# Patient Record
Sex: MALE | Race: WHITE | Employment: OTHER | URBAN - METROPOLITAN AREA
[De-identification: names, ages, dates, MRNs, and addresses within clinical notes are randomized per-mention and may not be internally consistent; named-entity substitution may affect disease eponyms.]

---

## 2017-01-18 PROCEDURE — 82607 VITAMIN B-12: CPT | Performed by: NURSE PRACTITIONER

## 2017-01-18 PROCEDURE — 80184 ASSAY OF PHENOBARBITAL: CPT | Performed by: NURSE PRACTITIONER

## 2017-01-18 PROCEDURE — 80175 DRUG SCREEN QUAN LAMOTRIGINE: CPT | Performed by: NURSE PRACTITIONER

## 2017-01-18 PROCEDURE — 80171 DRUG SCREEN QUANT GABAPENTIN: CPT | Performed by: NURSE PRACTITIONER

## 2017-01-30 PROCEDURE — 36415 COLL VENOUS BLD VENIPUNCTURE: CPT | Performed by: NURSE PRACTITIONER

## 2017-01-30 PROCEDURE — 80184 ASSAY OF PHENOBARBITAL: CPT | Performed by: NURSE PRACTITIONER

## 2017-04-28 PROBLEM — G40.909 SEIZURE DISORDER (HCC): Status: ACTIVE | Noted: 2017-04-28

## 2017-05-15 ENCOUNTER — NURSE ONLY (OUTPATIENT)
Dept: ELECTROPHYSIOLOGY | Facility: HOSPITAL | Age: 47
End: 2017-05-15
Attending: NURSE PRACTITIONER
Payer: MEDICARE

## 2017-05-15 DIAGNOSIS — R56.9 SEIZURE (HCC): ICD-10-CM

## 2017-05-15 DIAGNOSIS — Q28.2 CEREBRAL AVM: ICD-10-CM

## 2017-05-15 PROCEDURE — 95813 EEG EXTND MNTR 61-119 MIN: CPT

## 2017-05-16 NOTE — PROCEDURES
[facility], [address] [place] [address]      PATIENT'S NAME: Brittney Farah   ATTENDING PHYSICIAN: Pauline Nam M.D.    PATIENT ACCOUNT #: [de-identified] [place] [facility] [place]   MEDICAL RECORD #: [id number] DATE OF BIRTH seizure activity on this exam.  There was, again, consistent left hemispheric slowing throughout the entire exam.  The patient did not have any of his typical clinical episodes suspicious for seizure, i.e. no tremors were noted.   The findings noted on this

## 2017-05-16 NOTE — PROGRESS NOTES
Quick Note:    Has sharp waves on EEG, propensity toward seizures  If clinical seizures are still happening, med adjustments may be made appropriately  ______

## 2017-06-12 PROCEDURE — 36415 COLL VENOUS BLD VENIPUNCTURE: CPT | Performed by: NURSE PRACTITIONER

## 2017-06-12 PROCEDURE — 80184 ASSAY OF PHENOBARBITAL: CPT | Performed by: NURSE PRACTITIONER

## 2017-06-30 PROCEDURE — 80184 ASSAY OF PHENOBARBITAL: CPT | Performed by: NURSE PRACTITIONER

## 2017-06-30 PROCEDURE — 82140 ASSAY OF AMMONIA: CPT | Performed by: NURSE PRACTITIONER

## 2017-06-30 PROCEDURE — 80171 DRUG SCREEN QUANT GABAPENTIN: CPT | Performed by: NURSE PRACTITIONER

## 2017-07-14 ENCOUNTER — HOSPITAL ENCOUNTER (OUTPATIENT)
Age: 47
Discharge: HOME | End: 2017-07-14
Attending: FAMILY MEDICINE
Payer: MEDICARE

## 2017-07-14 VITALS
SYSTOLIC BLOOD PRESSURE: 125 MMHG | OXYGEN SATURATION: 97 % | HEART RATE: 63 BPM | RESPIRATION RATE: 18 BRPM | DIASTOLIC BLOOD PRESSURE: 92 MMHG

## 2017-07-14 DIAGNOSIS — M54.50 ACUTE MIDLINE LOW BACK PAIN WITHOUT SCIATICA: ICD-10-CM

## 2017-07-14 DIAGNOSIS — B37.2 YEAST DERMATITIS: Primary | ICD-10-CM

## 2017-07-14 PROCEDURE — 99204 OFFICE O/P NEW MOD 45 MIN: CPT

## 2017-07-14 PROCEDURE — 99203 OFFICE O/P NEW LOW 30 MIN: CPT

## 2017-07-14 RX ORDER — NYSTATIN 500000 [USP'U]/1
1 TABLET, COATED ORAL EVERY 8 HOURS SCHEDULED
Qty: 21 TABLET | Refills: 0 | Status: SHIPPED | OUTPATIENT
Start: 2017-07-14 | End: 2018-03-07 | Stop reason: ALTCHOICE

## 2017-07-14 NOTE — ED PROVIDER NOTES
Patient Seen in: [facility]    History   Patient presents with:  Back Pain    Stated Complaint: pain low back x 1 day    HPI    This 44-year-old male presents to the office with complaint of lower back pain which started la 2 (two) times daily. gabapentin 300 MG Oral Cap,  Take 3 capsules (900 mg total) by mouth 3 (three) times daily.    PHENobarbital 32.4 MG Oral Tab,  2 in the am and 3 in the pm   mupirocin 2 % External Ointment,  Apply 1 Application topically 2 (two) time ED Triage Vitals [07/14/17 1621]  BP: 125/92  Pulse: 63  Resp: 18  Temp: n/a  Temp src: Temporal  SpO2: 97 %  O2 Device: None (Room air)    Current:/92   Pulse 63   Resp 18   SpO2 97%         Physical Exam    General: WH/WN/WD, in NAD, sitting in h nystatin tablets 1 tablet every 8 hours for the next week until the redness and pain to your lower back resolves. Continue to apply the nystatin cream topically. You may take Tylenol as needed for discomfort.   Follow-up with your primary doctor in 3 days

## 2017-07-21 PROCEDURE — 83655 ASSAY OF LEAD: CPT | Performed by: FAMILY MEDICINE

## 2017-07-21 PROCEDURE — 82175 ASSAY OF ARSENIC: CPT | Performed by: FAMILY MEDICINE

## 2018-03-06 PROCEDURE — 80175 DRUG SCREEN QUAN LAMOTRIGINE: CPT | Performed by: NURSE PRACTITIONER

## 2018-03-06 PROCEDURE — 82607 VITAMIN B-12: CPT | Performed by: NURSE PRACTITIONER

## 2018-03-06 PROCEDURE — 80171 DRUG SCREEN QUANT GABAPENTIN: CPT | Performed by: NURSE PRACTITIONER

## 2018-03-06 PROCEDURE — 80184 ASSAY OF PHENOBARBITAL: CPT | Performed by: NURSE PRACTITIONER

## 2018-03-08 PROCEDURE — 36415 COLL VENOUS BLD VENIPUNCTURE: CPT | Performed by: FAMILY MEDICINE

## 2018-03-08 PROCEDURE — 82175 ASSAY OF ARSENIC: CPT | Performed by: FAMILY MEDICINE

## 2018-03-08 PROCEDURE — 83655 ASSAY OF LEAD: CPT | Performed by: FAMILY MEDICINE

## 2018-03-09 NOTE — ED NOTES
Pt called ED sounded sleepy and lethargic pt called ER concerned that he feels someone is poisoning him and what tests would we do to see what poison he may have ingested. Pt then states he feels he is fine and hung up.  [facility] [facility] police notified by Yrn Guardado

## 2018-09-13 PROBLEM — F41.9 ANXIETY: Status: ACTIVE | Noted: 2018-09-13

## 2019-02-25 PROBLEM — R25.2 SPASTICITY: Status: ACTIVE | Noted: 2019-02-25

## 2019-03-05 PROCEDURE — 80175 DRUG SCREEN QUAN LAMOTRIGINE: CPT | Performed by: NURSE PRACTITIONER

## 2019-03-05 PROCEDURE — 80171 DRUG SCREEN QUANT GABAPENTIN: CPT | Performed by: NURSE PRACTITIONER

## 2019-07-02 PROCEDURE — 87070 CULTURE OTHR SPECIMN AEROBIC: CPT | Performed by: FAMILY MEDICINE

## 2019-07-02 PROCEDURE — 87186 SC STD MICRODIL/AGAR DIL: CPT | Performed by: FAMILY MEDICINE

## 2019-07-02 PROCEDURE — 87205 SMEAR GRAM STAIN: CPT | Performed by: FAMILY MEDICINE

## 2019-07-02 PROCEDURE — 87077 CULTURE AEROBIC IDENTIFY: CPT | Performed by: FAMILY MEDICINE

## 2019-07-30 PROBLEM — R09.02 HYPOXEMIA: Status: ACTIVE | Noted: 2017-01-10

## 2019-07-30 PROBLEM — G47.31 CENTRAL SLEEP APNEA: Status: ACTIVE | Noted: 2017-01-10

## 2019-09-22 ENCOUNTER — HOSPITAL ENCOUNTER (EMERGENCY)
Facility: HOSPITAL | Age: 49
Discharge: HOME | End: 2019-09-22
Attending: EMERGENCY MEDICINE
Payer: MEDICARE

## 2019-09-22 ENCOUNTER — APPOINTMENT (OUTPATIENT)
Dept: CT IMAGING | Facility: HOSPITAL | Age: 49
End: 2019-09-22
Attending: EMERGENCY MEDICINE
Payer: MEDICARE

## 2019-09-22 VITALS
OXYGEN SATURATION: 98 % | HEIGHT: 75 IN | RESPIRATION RATE: 15 BRPM | DIASTOLIC BLOOD PRESSURE: 109 MMHG | WEIGHT: 190 LBS | SYSTOLIC BLOOD PRESSURE: 143 MMHG | TEMPERATURE: 98 F | HEART RATE: 84 BPM | BODY MASS INDEX: 23.62 KG/M2

## 2019-09-22 DIAGNOSIS — S09.90XA INJURY OF HEAD, INITIAL ENCOUNTER: Primary | ICD-10-CM

## 2019-09-22 DIAGNOSIS — S01.81XA FACIAL LACERATION, INITIAL ENCOUNTER: ICD-10-CM

## 2019-09-22 DIAGNOSIS — S02.19XA CLOSED FRACTURE OF FRONTAL SINUS, INITIAL ENCOUNTER (HCC): ICD-10-CM
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
## 2019-09-22 PROCEDURE — 99284 EMERGENCY DEPT VISIT MOD MDM: CPT

## 2019-09-22 PROCEDURE — 12015 RPR F/E/E/N/L/M 7.6-12.5 CM: CPT

## 2019-09-22 PROCEDURE — 72125 CT NECK SPINE W/O DYE: CPT | Performed by: EMERGENCY MEDICINE

## 2019-09-22 PROCEDURE — 96374 THER/PROPH/DIAG INJ IV PUSH: CPT

## 2019-09-22 PROCEDURE — 70450 CT HEAD/BRAIN W/O DYE: CPT | Performed by: EMERGENCY MEDICINE

## 2019-09-22 RX ORDER — BUPIVACAINE HYDROCHLORIDE 2.5 MG/ML
INJECTION, SOLUTION EPIDURAL; INFILTRATION; INTRACAUDAL
Status: COMPLETED
Start: 2019-09-22 | End: 2019-09-22

## 2019-09-22 RX ORDER — MORPHINE SULFATE 4 MG/ML
4 INJECTION, SOLUTION INTRAMUSCULAR; INTRAVENOUS ONCE
Status: COMPLETED | OUTPATIENT
Start: 2019-09-22 | End: 2019-09-22

## 2019-09-22 RX ORDER — HYDROCODONE BITARTRATE AND ACETAMINOPHEN 5; 325 MG/1; MG/1
2 TABLET ORAL ONCE
Status: DISCONTINUED | OUTPATIENT
Start: 2019-09-22 | End: 2019-09-22

## 2019-09-22 RX ORDER — LEVOFLOXACIN 500 MG/1
500 TABLET, FILM COATED ORAL DAILY
Qty: 7 TABLET | Refills: 0 | Status: SHIPPED | OUTPATIENT
Start: 2019-09-22 | End: 2019-09-29

## 2019-09-22 RX ORDER — ACETAMINOPHEN 500 MG
1000 TABLET ORAL ONCE
Status: COMPLETED | OUTPATIENT
Start: 2019-09-22 | End: 2019-09-22

## 2019-09-22 RX ORDER — BUPIVACAINE HYDROCHLORIDE 2.5 MG/ML
5 INJECTION, SOLUTION EPIDURAL; INFILTRATION; INTRACAUDAL ONCE
Status: COMPLETED | OUTPATIENT
Start: 2019-09-22 | End: 2019-09-22

## 2019-09-22 NOTE — ED INITIAL ASSESSMENT (HPI)
Patient arrives via medics with laceration to forehead. Patient is paraplegic, was working out at home and believes he lost balance, fell forward to the ground and hit head on weight and sustained laceration to forehead.   Patient head is wrapped in gauze

## 2019-09-23 NOTE — ED PROVIDER NOTES
Patient Seen in: [facility] Emergency Department      History   Patient presents with:  Trauma (cardiovascular, musculoskeletal)    Stated Complaint:     HPI    Patient is a 57-year-old male presents to ED for evaluation after a fall out of his whee None (Room air)       Current:BP (!) 147/103   Pulse 82   Temp 98 °F (36.7 °C) (Axillary)   Resp 15   Ht 190.5 cm (6' 3\")   Wt 86.2 kg   SpO2 100%   BMI 23.75 kg/m²         Physical Exam    Constitutional: Patient is a gentleman who is in no acute distres the posterior left lateral ventricle, left occipital horn, and left temporal horn noted. This is similar since the remote head CT since 12/7/2007.   There is a small amount of encephalomalacia is seen within the parasagittal left parietal lobe, unchanged s narrowing is noted. There is scattered facet and uncinate hypertrophy. Please refer to the concurrent head CT for further characterization of the intracranial findings. There is nonspecific nodularity to the left occipital scalp.   Clinical correlation w verbal and written discharge and follow-up instructions were provided to help prevent relapse or worsening.   Patient was instructed to follow-up with her primary care provider for further evaluation and treatment, but to return immediately to the ER for wo

## 2020-01-10 PROBLEM — I69.351 HEMIPLEGIA AND HEMIPARESIS FOLLOWING CEREBRAL INFARCTION AFFECTING RIGHT DOMINANT SIDE (HCC): Status: ACTIVE | Noted: 2020-01-10

## 2020-05-19 ENCOUNTER — HOSPITAL ENCOUNTER (OUTPATIENT)
Dept: GENERAL RADIOLOGY | Facility: HOSPITAL | Age: 50
Discharge: HOME | End: 2020-05-19
Attending: FAMILY MEDICINE
Payer: MEDICARE

## 2020-05-19 DIAGNOSIS — T17.998A ASPIRATION OF LIQUID, INITIAL ENCOUNTER: ICD-10-CM

## 2020-05-19 DIAGNOSIS — R05.9 COUGH: ICD-10-CM

## 2020-05-19 PROCEDURE — 92611 MOTION FLUOROSCOPY/SWALLOW: CPT

## 2020-05-19 PROCEDURE — 74230 X-RAY XM SWLNG FUNCJ C+: CPT | Performed by: FAMILY MEDICINE

## 2020-05-19 NOTE — PROGRESS NOTES
ADULT VIDEOFLUOROSCOPIC SWALLOWING STUDY    Admission Date: 5/19/2020  Evaluation Date: 05/19/20  Radiologist: Dr. Lory Birmingham   Diet Recommendations - Solids: Regular  Diet Recommendations - Liquid: Thin(no straws)    Further Follow-up: prior to any po presentation. He reports he drinks from cups and straws but that straws are easier for him. Family/Patient Goals:   To find reason for ongoing cough     ASSESSMENT   DYSPHAGIA ASSESSMENT  Test completed in conjunction with Radiologist. Yes  Penetration Aspiration Scale Score: Score 8: Material enters the airway, passes below the vocal folds, and no effort is made to eject(with a delayed, ineffective cough)       Overall Impression: Patient presents with mild oral and moderate pharyngeal

## 2020-06-05 ENCOUNTER — OFFICE VISIT (OUTPATIENT)
Dept: SPEECH THERAPY | Facility: HOSPITAL | Age: 50
End: 2020-06-05
Attending: FAMILY MEDICINE
Payer: MEDICARE

## 2020-06-05 PROCEDURE — 92610 EVALUATE SWALLOWING FUNCTION: CPT

## 2020-06-05 NOTE — PROGRESS NOTES
ADULT SWALLOWING EVALUATION:   Referring Physician: Dr. Tom Carranza  Diagnosis: Cough (R05); Hemiplegia and hemiparesis following cerebral infarction affecting right dominant side (HCC) (I69.351); Aspiration of liquid, initial encounter (E85.172G);  Seizure disor 6  FINASTERIDE 1 MG Oral Tab, TAKE ONE TABLET BY MOUTH DAILY. , Disp: 30 tablet, Rfl: 6  Ciclopirox 1 % External Shampoo, Use QOD to scalp, Disp: 120 mL, Rfl: 6  FLUOCINOLONE ACETONIDE 0.01 % External Solution, APPLY TO ITCHY SPOTS ONCE DAILY, Disp: 60 mL, Oral Tab, Take by mouth daily. , Disp: , Rfl:   Vitamin B-12 1000 MCG Oral Tab, Take 3,000 mcg by mouth daily. , Disp: , Rfl:   folic acid 1 MG Oral Tab, Take 1 mg by mouth daily. , Disp: , Rfl:   Biotin (BIOTIN 5000) 5 MG Oral Cap, Take by mouth daily. , Disp liquids (NTL),  puree (applesauce), and hard solids (estella cracker). Oral phase was deemed impaired characterized by mild-moderate residue for soft and hard solids upon visual check of oral cavity; VFSS findings also showed deficits for bolus formation/pr particularly water. (Please note: Silent aspiration cannot be evaluated clinically.  Videofluoroscopic Swallow Study is required to rule-out silent aspiration.)    Compensatory Swallow Strategies Utilized: Upright 90 degrees, slow rate, small bites, double Potential: good for stated goals given regular attendance and compliance with HEP.       Patient/Family/Caregiver was advised of these findings, precautions, and treatment options and has agreed to actively participate in planning and for this course of car

## 2020-06-19 ENCOUNTER — OFFICE VISIT (OUTPATIENT)
Dept: SPEECH THERAPY | Facility: HOSPITAL | Age: 50
End: 2020-06-19
Attending: FAMILY MEDICINE
Payer: MEDICARE

## 2020-06-19 PROCEDURE — 92526 ORAL FUNCTION THERAPY: CPT

## 2020-06-19 NOTE — PROGRESS NOTES
Treatment #2/10 (Medicare 2/10; POC thru 9/3/20) Treatment Time: 60 minutes  Precautions: aspiration     Charges: 1 billed ([id number])  Pain: 0/10      Diagnosis: Cough (R05);  Hemiplegia and hemiparesis following cerebral infarction affecting right dominant si strengthening exercises (eg: Effortful swallow, mendelhsohn) x10 given min verbal/visual cues to improve strength/function of swallow mechanism (3 months). Discontinue due to lack of appropriateness.     Assessment: Patient presents with oropharyngeal dysp

## 2020-06-23 ENCOUNTER — OFFICE VISIT (OUTPATIENT)
Dept: SPEECH THERAPY | Facility: HOSPITAL | Age: 50
End: 2020-06-23
Attending: FAMILY MEDICINE
Payer: MEDICARE

## 2020-06-23 PROCEDURE — 92526 ORAL FUNCTION THERAPY: CPT

## 2020-06-23 NOTE — PROGRESS NOTES
Treatment #3/10 (Medicare 3/10; POC thru 9/3/20) Treatment Time: 60 minutes  Precautions: aspiration     Charges: 1 billed ([id number])  Pain: 0/10      Diagnosis: Cough (R05);  Hemiplegia and hemiparesis following cerebral infarction affecting right dominant si of water during this session. Assessment: Patient presents with oropharyngeal dysphagia characterized by impaired bolus formation/propulsion and s/s of aspiration (ie: consistent cough and wet voice).   VFSS findings from 5/19/20 showed: Patient presents

## 2020-06-30 ENCOUNTER — OFFICE VISIT (OUTPATIENT)
Dept: SPEECH THERAPY | Facility: HOSPITAL | Age: 50
End: 2020-06-30
Attending: FAMILY MEDICINE
Payer: MEDICARE

## 2020-06-30 PROCEDURE — 92526 ORAL FUNCTION THERAPY: CPT

## 2020-06-30 NOTE — PROGRESS NOTES
Treatment #4/10 (Medicare 4/10; POC thru 9/3/20) Treatment Time: 60 minutes  Precautions: aspiration     Charges: 1 billed ([id number])  Pain: 0/10      Diagnosis: Cough (R05);  Hemiplegia and hemiparesis following cerebral infarction affecting right dominant si formation/propulsion and s/s of aspiration (ie: consistent cough and wet voice).   VFSS findings from 5/19/20 showed: Patient presents with mild oral and moderate pharyngeal dysphagia characterized by reduced bolus formation and control with all po though w

## 2020-07-06 ENCOUNTER — TELEPHONE (OUTPATIENT)
Dept: SURGERY | Facility: CLINIC | Age: 50
End: 2020-07-06

## 2020-07-06 NOTE — TELEPHONE ENCOUNTER
Message below noted. Ok to schedule surgical discussion appointment with . Message forwarded to the  to schedule above appointment.

## 2020-07-06 NOTE — TELEPHONE ENCOUNTER
Pt's spouse calling to see if baclofen pump can be replaced (by Oct of 2020), last replacement in 2014 w/Dr. Jaja Stokes; Dr. Jaja Stokes is no longer @ [facility], pt has not tried to locate Dr. Jaja Stokes but was given Dr. Romie Oates by PCP/insurace; spouse awaiting c

## 2020-07-07 ENCOUNTER — OFFICE VISIT (OUTPATIENT)
Dept: SPEECH THERAPY | Facility: HOSPITAL | Age: 50
End: 2020-07-07
Attending: FAMILY MEDICINE
Payer: MEDICARE

## 2020-07-07 PROCEDURE — 92526 ORAL FUNCTION THERAPY: CPT

## 2020-07-07 NOTE — PROGRESS NOTES
Treatment #6/10 (Medicare 4/10; POC thru 9/3/20) Treatment Time: 60 minutes  Precautions: aspiration     Charges: 1 billed ([id number])  Pain: 0/10      Diagnosis: Cough (R05);  Hemiplegia and hemiparesis following cerebral infarction affecting right dominant si larger volume boluses (e.g. drinking via straw). There was uncontrolled spillage of the liquids to the valleculae and pyriform sinuses.   There was laryngeal penetration and silent tracheal aspiration with thin liquids by straw with delayed, ineffective co

## 2020-07-15 ENCOUNTER — TELEPHONE (OUTPATIENT)
Dept: SPEECH THERAPY | Facility: HOSPITAL | Age: 50
End: 2020-07-15

## 2020-07-15 ENCOUNTER — APPOINTMENT (OUTPATIENT)
Dept: SPEECH THERAPY | Facility: HOSPITAL | Age: 50
End: 2020-07-15
Attending: FAMILY MEDICINE
Payer: MEDICARE

## 2020-07-22 ENCOUNTER — OFFICE VISIT (OUTPATIENT)
Dept: SPEECH THERAPY | Facility: HOSPITAL | Age: 50
End: 2020-07-22
Attending: FAMILY MEDICINE
Payer: MEDICARE

## 2020-07-22 PROCEDURE — 92526 ORAL FUNCTION THERAPY: CPT

## 2020-07-22 NOTE — PROGRESS NOTES
Treatment #7/10 (Medicare 7/10; POC thru 9/3/20) Treatment Time: 60 minutes  Precautions: aspiration     Charges: 1 billed ([id number])  Pain: 0/10      Diagnosis: Cough (R05);  Hemiplegia and hemiparesis following cerebral infarction affecting right dominant si or tracheal aspiration with thin by cup or solids. SLP Diagnosis: Oropharyngeal Dysphagia (R13.12)    Plan: Continue swallow therapy targeting use of aspiration precautions and compensatory strategies.   HEP assigned to complete good oral hygiene and follo

## 2020-07-29 ENCOUNTER — OFFICE VISIT (OUTPATIENT)
Dept: SPEECH THERAPY | Facility: HOSPITAL | Age: 50
End: 2020-07-29
Attending: FAMILY MEDICINE
Payer: MEDICARE

## 2020-07-29 PROCEDURE — 92526 ORAL FUNCTION THERAPY: CPT

## 2020-07-29 NOTE — PROGRESS NOTES
Treatment #8/10 (Medicare 7/10; POC thru 9/3/20) Treatment Time: 60 minutes  Precautions: aspiration     Charges: 1 billed ([id number])  Pain: 0/10      Diagnosis: Cough (R05);  Hemiplegia and hemiparesis following cerebral infarction affecting right dominant si aspiration with thin by cup or solids. SLP Diagnosis: Oropharyngeal Dysphagia (R13.12)    Plan: Continue swallow therapy targeting use of aspiration precautions and compensatory strategies.   HEP assigned to complete good oral hygiene and follow with 80 sw

## 2020-08-05 ENCOUNTER — OFFICE VISIT (OUTPATIENT)
Dept: SPEECH THERAPY | Facility: HOSPITAL | Age: 50
End: 2020-08-05
Attending: FAMILY MEDICINE
Payer: MEDICARE

## 2020-08-05 PROCEDURE — 92526 ORAL FUNCTION THERAPY: CPT

## 2020-08-05 NOTE — PROGRESS NOTES
Treatment #9/10 (Medicare 7/10; POC thru 9/3/20) Treatment Time: 60 minutes  Precautions: aspiration     Charges: 1 billed ([id number])  Pain: 0/10      Diagnosis: Cough (R05);  Hemiplegia and hemiparesis following cerebral infarction affecting right dominant si laryngeal penetration or tracheal aspiration with thin by cup or solids. SLP Diagnosis: Oropharyngeal Dysphagia (R13.12)    Plan: Discussed plateau in progress. Patient in agreement.   Continue swallow therapy for one more session with discharge to follow

## 2020-08-12 ENCOUNTER — OFFICE VISIT (OUTPATIENT)
Dept: SPEECH THERAPY | Facility: HOSPITAL | Age: 50
End: 2020-08-12
Attending: FAMILY MEDICINE
Payer: MEDICARE

## 2020-08-12 PROCEDURE — 92526 ORAL FUNCTION THERAPY: CPT

## 2020-08-12 NOTE — PROGRESS NOTES
Treatment #10/10 (Medicare 10/10; POC thru 9/3/20) Treatment Time: 60 minutes  Precautions: aspiration     Charges: 1 billed ([id number])  Pain: 0/10      Diagnosis: Cough (R05);  Hemiplegia and hemiparesis following cerebral infarction affecting right dominant aspiration precautions and posture during PO intake and that this has facilitated improvement.      STG 2: Patient will use aspiration precautions and/or compensatory swallowing strategies with clinician/caregiver during small snack/meal with 100% accuracy

## 2020-08-27 ENCOUNTER — TELEPHONE (OUTPATIENT)
Dept: SURGERY | Facility: CLINIC | Age: 50
End: 2020-08-27

## 2020-08-27 ENCOUNTER — OFFICE VISIT (OUTPATIENT)
Dept: SURGERY | Facility: CLINIC | Age: 50
End: 2020-08-27
Payer: MEDICARE

## 2020-08-27 VITALS
BODY MASS INDEX: 25 KG/M2 | DIASTOLIC BLOOD PRESSURE: 70 MMHG | WEIGHT: 196 LBS | SYSTOLIC BLOOD PRESSURE: 118 MMHG | HEART RATE: 74 BPM

## 2020-08-27 DIAGNOSIS — Z97.8 PRESENCE OF INTRATHECAL PUMP: Primary | ICD-10-CM

## 2020-08-27 PROCEDURE — 3074F SYST BP LT 130 MM HG: CPT | Performed by: NEUROLOGICAL SURGERY

## 2020-08-27 PROCEDURE — 3078F DIAST BP <80 MM HG: CPT | Performed by: NEUROLOGICAL SURGERY

## 2020-08-27 PROCEDURE — 99203 OFFICE O/P NEW LOW 30 MIN: CPT | Performed by: NEUROLOGICAL SURGERY

## 2020-08-27 NOTE — H&P
Neurosurgery Clinic Visit  2020    Lety Bhakta PCP:  Justin Barnhart MD    3/23/1970 MRN [id number]       CHIEF COMPLAINT:  Patient presents with:  New Patient      HISTORY OF PRESENT ILLNESS:  Lety Bhakta is a(n) 52year old male who was Spacer/Aero-Holding Chambers (AEROCHAMBER W/FLOWSIGNAL) Does not apply Misc Take as directed 1 each 0   • Cholecalciferol (VITAMIN D) 1000 units Oral Tab Take 1 tablet by mouth daily.      • Calcium Carb-Ergocalciferol 500-200 MG-UNIT Oral Tab Take 1 tablet SURGICAL HISTORY  2014    baclofen pump replacement   (sani)     No family history on file. Willie Reina  reports that he has never smoked. He has never used smokeless tobacco. He reports that he does not drink alcohol or use drugs.     PHYSICAL EXAMINATION:  V bleeding, infection, neurological injury, and failure to resolve pain or symptoms. The patient wishes to pursue surgical intervention. Will call to schedule with his wife. Dr. Anyi Garcia evaluated the patient and is in agreement with the plan.       Total

## 2020-08-27 NOTE — H&P (VIEW-ONLY)
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      • Calcium Carb-Ergocalciferol 500-200 MG-UNIT Oral Tab Take 1 tablet SURGICAL HISTORY  2014    baclofen pump replacement   (sani)     No family history on file. Gordo Naranjo  reports that he has never smoked. He has never used smokeless tobacco. He reports that he does not drink alcohol or use drugs.     PHYSICAL EXAMINATION:  V bleeding, infection, neurological injury, and failure to resolve pain or symptoms. The patient wishes to pursue surgical intervention. Will call to schedule with his wife. Dr. Milton Day evaluated the patient and is in agreement with the plan.       Total

## 2020-08-27 NOTE — PROGRESS NOTES
New Pt referred by Pt's PCP for baclofen pump replacement.       pump was placed in 2014 by Dr Katia Mcgee

## 2020-08-27 NOTE — PATIENT INSTRUCTIONS
Refill policies:    • Allow 2-3 business days for refills; controlled substances may take longer.   • Contact your pharmacy at least 5 days prior to running out of medication and have them send an electronic request or submit request through the “request re Depending on your insurance carrier, approval may take 3-10 days. It is highly recommended patients contact their insurance carrier directly to determine coverage.   If test is done without insurance authorization, patient may be responsible for the entire pre-admissions department at [phone] to speak with a nurse. ·   · You will need to have some blood work done prior to surgery.   ·   · Nothing to eat or drink 11:00pm the night before your surgery unless otherwise instructed   ·   · Do drink 12 ounces [phone]. -The hard copy of script will need to be mailed to the compounding pharmacy to address:    The    [facility] [facility] [address]  [place], [address] [place]

## 2020-08-28 NOTE — TELEPHONE ENCOUNTER
Called patient's wife BODØ to confirm surgery date. BODØ stated that she is agreeable to 9/23/20, and if patient could have start time of 1300, they would appreciate it.       You are scheduled for a Baclofen pump implant/replacement on 9/23/20 wit surgery. ([facility]-Phone #: [phone]; [facility] Allen- Phone #:[phone])  and  - Email [email or web address] including the following information: patient name, , medication requested and date of surgery.   - Fax Baclofen prescription to the compoun

## 2020-08-28 NOTE — TELEPHONE ENCOUNTER
Baclofen pump prescription:    Gablofen 2000 mcg/mL  751. 7mcg/day  40mL pump    Delvin Wright and Zayra Spaulding at [facility] have been notified via E-mail. [facility]ed [email or web address] with pertinent information.      Still need to fax and mail prescription to the compo

## 2020-08-28 NOTE — TELEPHONE ENCOUNTER
[employer] message with pre-op instructions and information sent to patient. PCP clearance request letter sent to Dr. Delon Evans to fax number:    [phone]    Surgery order and Covid 19 testing orders completed and sent to OR via Epic.     Unsigned order fo

## 2020-08-31 ENCOUNTER — TELEPHONE (OUTPATIENT)
Dept: PHYSICAL THERAPY | Facility: HOSPITAL | Age: 50
End: 2020-08-31

## 2020-09-03 NOTE — TELEPHONE ENCOUNTER
Baclofen script faxed and mailed to compounding pharmacy. Email sent to [employer] and the [facility]. PCP clearance needed.

## 2020-09-03 NOTE — TELEPHONE ENCOUNTER
I am unable to sign this Rx. Epic keeps coming up with an error message. I tried to put in a different order, but I am not sure if this is the right one.

## 2020-09-04 NOTE — TELEPHONE ENCOUNTER
Called and spoke to patents wife and explained patient still requires an appointment with Dr. Surekha Patiño for clearance prior to surgery.   Patients wife stated they will call Dr. Surekha Patiño for the appointment, they have scheduled appointment for COVID 19 testing, and

## 2020-09-17 ENCOUNTER — TELEPHONE (OUTPATIENT)
Dept: SPEECH THERAPY | Facility: HOSPITAL | Age: 50
End: 2020-09-17

## 2020-09-17 RX ORDER — LORATADINE 10 MG/1
10 TABLET ORAL DAILY
COMMUNITY
End: 2021-03-02

## 2020-09-18 ENCOUNTER — TELEPHONE (OUTPATIENT)
Dept: SPEECH THERAPY | Facility: HOSPITAL | Age: 50
End: 2020-09-18

## 2020-09-18 NOTE — TELEPHONE ENCOUNTER
LVM for patient regarding use of straws. Advised against use of straws generally. Stated that sips by cup increase safety of liquid consumption as patient's handling of bolus is decreased.   Advised patient to continue with cups as was done during speech

## 2020-09-20 ENCOUNTER — APPOINTMENT (OUTPATIENT)
Dept: LAB | Facility: HOSPITAL | Age: 50
End: 2020-09-20
Attending: NEUROLOGICAL SURGERY
Payer: MEDICARE

## 2020-09-20 DIAGNOSIS — Z97.8 PRESENCE OF INTRATHECAL PUMP: ICD-10-CM

## 2020-09-21 LAB — SARS-COV-2 RNA RESP QL NAA+PROBE: NOT DETECTED

## 2020-09-23 ENCOUNTER — APPOINTMENT (OUTPATIENT)
Dept: GENERAL RADIOLOGY | Facility: HOSPITAL | Age: 50
End: 2020-09-23
Attending: PHYSICIAN ASSISTANT
Payer: MEDICARE

## 2020-09-23 ENCOUNTER — HOSPITAL ENCOUNTER (OUTPATIENT)
Facility: HOSPITAL | Age: 50
LOS: 1 days | Discharge: HOME | End: 2020-09-24
Attending: NEUROLOGICAL SURGERY | Admitting: NEUROLOGICAL SURGERY
Payer: MEDICARE

## 2020-09-23 ENCOUNTER — ANESTHESIA EVENT (OUTPATIENT)
Dept: SURGERY | Facility: HOSPITAL | Age: 50
End: 2020-09-23
Payer: MEDICARE

## 2020-09-23 ENCOUNTER — ANESTHESIA (OUTPATIENT)
Dept: SURGERY | Facility: HOSPITAL | Age: 50
End: 2020-09-23
Payer: MEDICARE

## 2020-09-23 ENCOUNTER — APPOINTMENT (OUTPATIENT)
Dept: GENERAL RADIOLOGY | Facility: HOSPITAL | Age: 50
End: 2020-09-23
Attending: NEUROLOGICAL SURGERY
Payer: MEDICARE

## 2020-09-23 DIAGNOSIS — Z97.8 PRESENCE OF INTRATHECAL PUMP: ICD-10-CM

## 2020-09-23 PROCEDURE — 99204 OFFICE O/P NEW MOD 45 MIN: CPT | Performed by: INTERNAL MEDICINE

## 2020-09-23 PROCEDURE — 0JH80VZ INSERTION OF INFUSION PUMP INTO ABDOMEN SUBCUTANEOUS TISSUE AND FASCIA, OPEN APPROACH: ICD-10-PCS | Performed by: NEUROLOGICAL SURGERY

## 2020-09-23 PROCEDURE — 0JPT0VZ REMOVAL OF INFUSION PUMP FROM TRUNK SUBCUTANEOUS TISSUE AND FASCIA, OPEN APPROACH: ICD-10-PCS | Performed by: NEUROLOGICAL SURGERY

## 2020-09-23 PROCEDURE — 74019 RADEX ABDOMEN 2 VIEWS: CPT | Performed by: PHYSICIAN ASSISTANT

## 2020-09-23 DEVICE — INTRATHECAL PUMP 8637-40: Type: IMPLANTABLE DEVICE | Status: FUNCTIONAL

## 2020-09-23 RX ORDER — GLYCOPYRROLATE 0.2 MG/ML
INJECTION, SOLUTION INTRAMUSCULAR; INTRAVENOUS AS NEEDED
Status: DISCONTINUED | OUTPATIENT
Start: 2020-09-23 | End: 2020-09-23 | Stop reason: SURG

## 2020-09-23 RX ORDER — PHENOBARBITAL 32.4 MG/1
64.8 TABLET ORAL 2 TIMES DAILY
COMMUNITY
End: 2020-10-27

## 2020-09-23 RX ORDER — BISACODYL 10 MG
10 SUPPOSITORY, RECTAL RECTAL
Status: DISCONTINUED | OUTPATIENT
Start: 2020-09-23 | End: 2020-09-24

## 2020-09-23 RX ORDER — SULFAMETHOXAZOLE AND TRIMETHOPRIM 800; 160 MG/1; MG/1
1 TABLET ORAL 2 TIMES DAILY
Status: DISCONTINUED | OUTPATIENT
Start: 2020-09-23 | End: 2020-09-24

## 2020-09-23 RX ORDER — ONDANSETRON 2 MG/ML
4 INJECTION INTRAMUSCULAR; INTRAVENOUS AS NEEDED
Status: DISCONTINUED | OUTPATIENT
Start: 2020-09-23 | End: 2020-09-23 | Stop reason: HOSPADM

## 2020-09-23 RX ORDER — GABAPENTIN 300 MG/1
300 CAPSULE ORAL EVERY 24 HOURS
Status: DISCONTINUED | OUTPATIENT
Start: 2020-09-23 | End: 2020-09-24

## 2020-09-23 RX ORDER — SENNOSIDES 8.6 MG
17.2 TABLET ORAL NIGHTLY
Status: DISCONTINUED | OUTPATIENT
Start: 2020-09-23 | End: 2020-09-24

## 2020-09-23 RX ORDER — CETIRIZINE HYDROCHLORIDE 10 MG/1
10 TABLET ORAL DAILY
Status: DISCONTINUED | OUTPATIENT
Start: 2020-09-23 | End: 2020-09-24

## 2020-09-23 RX ORDER — GABAPENTIN 600 MG/1
300 TABLET ORAL SEE ADMIN INSTRUCTIONS
COMMUNITY
End: 2021-03-02

## 2020-09-23 RX ORDER — PROCHLORPERAZINE EDISYLATE 5 MG/ML
10 INJECTION INTRAMUSCULAR; INTRAVENOUS EVERY 6 HOURS PRN
Status: DISCONTINUED | OUTPATIENT
Start: 2020-09-23 | End: 2020-09-24

## 2020-09-23 RX ORDER — ELECTROLYTES/DEXTROSE
SOLUTION, ORAL ORAL DAILY
Status: DISCONTINUED | OUTPATIENT
Start: 2020-09-23 | End: 2020-09-23

## 2020-09-23 RX ORDER — MULTIPLE VITAMINS W/ MINERALS TAB 9MG-400MCG
1 TAB ORAL DAILY
Status: DISCONTINUED | OUTPATIENT
Start: 2020-09-23 | End: 2020-09-24

## 2020-09-23 RX ORDER — NEOSTIGMINE METHYLSULFATE 1 MG/ML
INJECTION INTRAVENOUS AS NEEDED
Status: DISCONTINUED | OUTPATIENT
Start: 2020-09-23 | End: 2020-09-23 | Stop reason: SURG

## 2020-09-23 RX ORDER — FINASTERIDE 1 MG/1
1 TABLET, FILM COATED ORAL DAILY
COMMUNITY
End: 2020-11-25

## 2020-09-23 RX ORDER — LAMOTRIGINE 150 MG/1
225 TABLET ORAL EVERY EVENING
COMMUNITY
End: 2021-03-02

## 2020-09-23 RX ORDER — MORPHINE SULFATE 2 MG/ML
2 INJECTION, SOLUTION INTRAMUSCULAR; INTRAVENOUS EVERY 2 HOUR PRN
Status: DISCONTINUED | OUTPATIENT
Start: 2020-09-23 | End: 2020-09-24

## 2020-09-23 RX ORDER — SULFAMETHOXAZOLE AND TRIMETHOPRIM 800; 160 MG/1; MG/1
1 TABLET ORAL 2 TIMES DAILY
COMMUNITY
End: 2020-12-26

## 2020-09-23 RX ORDER — MORPHINE SULFATE 2 MG/ML
1 INJECTION, SOLUTION INTRAMUSCULAR; INTRAVENOUS EVERY 2 HOUR PRN
Status: DISCONTINUED | OUTPATIENT
Start: 2020-09-23 | End: 2020-09-24

## 2020-09-23 RX ORDER — HYDROCODONE BITARTRATE AND ACETAMINOPHEN 5; 325 MG/1; MG/1
2 TABLET ORAL AS NEEDED
Status: DISCONTINUED | OUTPATIENT
Start: 2020-09-23 | End: 2020-09-23 | Stop reason: HOSPADM

## 2020-09-23 RX ORDER — LABETALOL HYDROCHLORIDE 5 MG/ML
5 INJECTION, SOLUTION INTRAVENOUS EVERY 5 MIN PRN
Status: DISCONTINUED | OUTPATIENT
Start: 2020-09-23 | End: 2020-09-23 | Stop reason: HOSPADM

## 2020-09-23 RX ORDER — VANCOMYCIN HYDROCHLORIDE
15 ONCE
Status: DISCONTINUED | OUTPATIENT
Start: 2020-09-23 | End: 2020-09-23 | Stop reason: HOSPADM

## 2020-09-23 RX ORDER — LAMOTRIGINE 150 MG/1
150 TABLET ORAL EVERY MORNING
COMMUNITY
End: 2021-03-02

## 2020-09-23 RX ORDER — POLYETHYLENE GLYCOL 3350 17 G/17G
17 POWDER, FOR SOLUTION ORAL DAILY
Status: DISCONTINUED | OUTPATIENT
Start: 2020-09-23 | End: 2020-09-24

## 2020-09-23 RX ORDER — DIPHENHYDRAMINE HYDROCHLORIDE 50 MG/ML
25 INJECTION INTRAMUSCULAR; INTRAVENOUS EVERY 4 HOURS PRN
Status: DISCONTINUED | OUTPATIENT
Start: 2020-09-23 | End: 2020-09-24

## 2020-09-23 RX ORDER — HYDROMORPHONE HYDROCHLORIDE 1 MG/ML
0.4 INJECTION, SOLUTION INTRAMUSCULAR; INTRAVENOUS; SUBCUTANEOUS EVERY 5 MIN PRN
Status: DISCONTINUED | OUTPATIENT
Start: 2020-09-23 | End: 2020-09-23 | Stop reason: HOSPADM

## 2020-09-23 RX ORDER — DEXAMETHASONE SODIUM PHOSPHATE 4 MG/ML
VIAL (ML) INJECTION AS NEEDED
Status: DISCONTINUED | OUTPATIENT
Start: 2020-09-23 | End: 2020-09-23 | Stop reason: SURG

## 2020-09-23 RX ORDER — FOLIC ACID 1 MG/1
1 TABLET ORAL DAILY
Status: DISCONTINUED | OUTPATIENT
Start: 2020-09-23 | End: 2020-09-24

## 2020-09-23 RX ORDER — BUPIVACAINE HYDROCHLORIDE AND EPINEPHRINE 2.5; 5 MG/ML; UG/ML
INJECTION, SOLUTION EPIDURAL; INFILTRATION; INTRACAUDAL; PERINEURAL AS NEEDED
Status: DISCONTINUED | OUTPATIENT
Start: 2020-09-23 | End: 2020-09-23 | Stop reason: HOSPADM

## 2020-09-23 RX ORDER — MORPHINE SULFATE 4 MG/ML
4 INJECTION, SOLUTION INTRAMUSCULAR; INTRAVENOUS EVERY 2 HOUR PRN
Status: DISCONTINUED | OUTPATIENT
Start: 2020-09-23 | End: 2020-09-24

## 2020-09-23 RX ORDER — ACETAMINOPHEN 500 MG
1000 TABLET ORAL ONCE
Status: DISCONTINUED | OUTPATIENT
Start: 2020-09-23 | End: 2020-09-23

## 2020-09-23 RX ORDER — FLUOCINOLONE ACETONIDE 0.1 MG/ML
SOLUTION TOPICAL DAILY
Status: DISCONTINUED | OUTPATIENT
Start: 2020-09-23 | End: 2020-09-24

## 2020-09-23 RX ORDER — LIDOCAINE HYDROCHLORIDE 10 MG/ML
INJECTION, SOLUTION EPIDURAL; INFILTRATION; INTRACAUDAL; PERINEURAL AS NEEDED
Status: DISCONTINUED | OUTPATIENT
Start: 2020-09-23 | End: 2020-09-23 | Stop reason: SURG

## 2020-09-23 RX ORDER — ONDANSETRON 2 MG/ML
INJECTION INTRAMUSCULAR; INTRAVENOUS AS NEEDED
Status: DISCONTINUED | OUTPATIENT
Start: 2020-09-23 | End: 2020-09-23 | Stop reason: SURG

## 2020-09-23 RX ORDER — SODIUM PHOSPHATE, DIBASIC AND SODIUM PHOSPHATE, MONOBASIC 7; 19 G/133ML; G/133ML
1 ENEMA RECTAL ONCE AS NEEDED
Status: DISCONTINUED | OUTPATIENT
Start: 2020-09-23 | End: 2020-09-24

## 2020-09-23 RX ORDER — ONDANSETRON 2 MG/ML
4 INJECTION INTRAMUSCULAR; INTRAVENOUS EVERY 4 HOURS PRN
Status: DISCONTINUED | OUTPATIENT
Start: 2020-09-23 | End: 2020-09-24

## 2020-09-23 RX ORDER — DOCUSATE SODIUM 100 MG/1
100 CAPSULE, LIQUID FILLED ORAL 2 TIMES DAILY
Status: DISCONTINUED | OUTPATIENT
Start: 2020-09-23 | End: 2020-09-24

## 2020-09-23 RX ORDER — PANTOPRAZOLE SODIUM 20 MG/1
20 TABLET, DELAYED RELEASE ORAL
Status: DISCONTINUED | OUTPATIENT
Start: 2020-09-24 | End: 2020-09-24

## 2020-09-23 RX ORDER — POLYETHYLENE GLYCOL 3350 17 G/17G
17 POWDER, FOR SOLUTION ORAL DAILY PRN
Status: DISCONTINUED | OUTPATIENT
Start: 2020-09-23 | End: 2020-09-24

## 2020-09-23 RX ORDER — BACITRACIN 50000 [USP'U]/1
INJECTION, POWDER, LYOPHILIZED, FOR SOLUTION INTRAMUSCULAR AS NEEDED
Status: DISCONTINUED | OUTPATIENT
Start: 2020-09-23 | End: 2020-09-23 | Stop reason: HOSPADM

## 2020-09-23 RX ORDER — ROCURONIUM BROMIDE 10 MG/ML
INJECTION, SOLUTION INTRAVENOUS AS NEEDED
Status: DISCONTINUED | OUTPATIENT
Start: 2020-09-23 | End: 2020-09-23 | Stop reason: SURG

## 2020-09-23 RX ORDER — SODIUM CHLORIDE AND POTASSIUM CHLORIDE .9; .15 G/100ML; G/100ML
75 SOLUTION INTRAVENOUS CONTINUOUS
Status: DISCONTINUED | OUTPATIENT
Start: 2020-09-23 | End: 2020-09-24

## 2020-09-23 RX ORDER — CYCLOBENZAPRINE HCL 10 MG
10 TABLET ORAL 3 TIMES DAILY PRN
Status: DISCONTINUED | OUTPATIENT
Start: 2020-09-23 | End: 2020-09-24

## 2020-09-23 RX ORDER — HYDROCODONE BITARTRATE AND ACETAMINOPHEN 5; 325 MG/1; MG/1
2 TABLET ORAL EVERY 4 HOURS PRN
Status: DISCONTINUED | OUTPATIENT
Start: 2020-09-23 | End: 2020-09-24

## 2020-09-23 RX ORDER — SELENIUM SULFIDE 2.5 MG/100ML
LOTION TOPICAL
Status: DISCONTINUED | OUTPATIENT
Start: 2020-09-23 | End: 2020-09-23

## 2020-09-23 RX ORDER — MIDAZOLAM HYDROCHLORIDE 1 MG/ML
1 INJECTION INTRAMUSCULAR; INTRAVENOUS EVERY 5 MIN PRN
Status: DISCONTINUED | OUTPATIENT
Start: 2020-09-23 | End: 2020-09-23 | Stop reason: HOSPADM

## 2020-09-23 RX ORDER — PHENOBARBITAL 32.4 MG/1
64.8 TABLET ORAL 2 TIMES DAILY
Status: DISCONTINUED | OUTPATIENT
Start: 2020-09-23 | End: 2020-09-24

## 2020-09-23 RX ORDER — AMANTADINE HYDROCHLORIDE 100 MG/1
100 CAPSULE, GELATIN COATED ORAL DAILY
COMMUNITY
End: 2020-11-25

## 2020-09-23 RX ORDER — SODIUM CHLORIDE, SODIUM LACTATE, POTASSIUM CHLORIDE, CALCIUM CHLORIDE 600; 310; 30; 20 MG/100ML; MG/100ML; MG/100ML; MG/100ML
INJECTION, SOLUTION INTRAVENOUS CONTINUOUS
Status: DISCONTINUED | OUTPATIENT
Start: 2020-09-23 | End: 2020-09-23 | Stop reason: HOSPADM

## 2020-09-23 RX ORDER — EPHEDRINE SULFATE 50 MG/ML
INJECTION, SOLUTION INTRAVENOUS AS NEEDED
Status: DISCONTINUED | OUTPATIENT
Start: 2020-09-23 | End: 2020-09-23 | Stop reason: SURG

## 2020-09-23 RX ORDER — ACETAMINOPHEN 325 MG/1
650 TABLET ORAL EVERY 4 HOURS PRN
Status: DISCONTINUED | OUTPATIENT
Start: 2020-09-23 | End: 2020-09-24

## 2020-09-23 RX ORDER — SULFACETAMIDE SODIUM 100 MG/ML
LOTION TOPICAL AS NEEDED
Status: DISCONTINUED | OUTPATIENT
Start: 2020-09-23 | End: 2020-09-24

## 2020-09-23 RX ORDER — FINASTERIDE 1 MG/1
1 TABLET, FILM COATED ORAL DAILY
Status: DISCONTINUED | OUTPATIENT
Start: 2020-09-23 | End: 2020-09-24

## 2020-09-23 RX ORDER — NALOXONE HYDROCHLORIDE 0.4 MG/ML
80 INJECTION, SOLUTION INTRAMUSCULAR; INTRAVENOUS; SUBCUTANEOUS AS NEEDED
Status: DISCONTINUED | OUTPATIENT
Start: 2020-09-23 | End: 2020-09-23 | Stop reason: HOSPADM

## 2020-09-23 RX ORDER — OMEPRAZOLE 20 MG/1
20 CAPSULE, DELAYED RELEASE ORAL
COMMUNITY
End: 2020-12-07

## 2020-09-23 RX ORDER — GABAPENTIN 600 MG/1
600 TABLET ORAL 2 TIMES DAILY
Status: DISCONTINUED | OUTPATIENT
Start: 2020-09-23 | End: 2020-09-24

## 2020-09-23 RX ORDER — CLINDAMYCIN PHOSPHATE 11.9 MG/ML
SOLUTION TOPICAL AS NEEDED
Status: DISCONTINUED | OUTPATIENT
Start: 2020-09-23 | End: 2020-09-24

## 2020-09-23 RX ORDER — HYDROCODONE BITARTRATE AND ACETAMINOPHEN 5; 325 MG/1; MG/1
1 TABLET ORAL AS NEEDED
Status: DISCONTINUED | OUTPATIENT
Start: 2020-09-23 | End: 2020-09-23 | Stop reason: HOSPADM

## 2020-09-23 RX ORDER — ALBUTEROL SULFATE 90 UG/1
2 AEROSOL, METERED RESPIRATORY (INHALATION) EVERY 4 HOURS PRN
Status: DISCONTINUED | OUTPATIENT
Start: 2020-09-23 | End: 2020-09-24

## 2020-09-23 RX ORDER — AMANTADINE HYDROCHLORIDE 100 MG/1
100 TABLET ORAL DAILY
Status: DISCONTINUED | OUTPATIENT
Start: 2020-09-24 | End: 2020-09-24

## 2020-09-23 RX ORDER — MUPIROCIN CALCIUM 20 MG/G
1 CREAM TOPICAL 2 TIMES DAILY
Status: DISCONTINUED | OUTPATIENT
Start: 2020-09-23 | End: 2020-09-24

## 2020-09-23 RX ORDER — GABAPENTIN 600 MG/1
600 TABLET ORAL 2 TIMES DAILY
COMMUNITY
End: 2021-02-17

## 2020-09-23 RX ORDER — PHENYLEPHRINE HCL 10 MG/ML
VIAL (ML) INJECTION AS NEEDED
Status: DISCONTINUED | OUTPATIENT
Start: 2020-09-23 | End: 2020-09-23 | Stop reason: SURG

## 2020-09-23 RX ORDER — DIPHENHYDRAMINE HYDROCHLORIDE 25 MG/1
5 TABLET ORAL DAILY
Status: DISCONTINUED | OUTPATIENT
Start: 2020-09-23 | End: 2020-09-24

## 2020-09-23 RX ORDER — DIPHENHYDRAMINE HCL 25 MG
25 CAPSULE ORAL EVERY 4 HOURS PRN
Status: DISCONTINUED | OUTPATIENT
Start: 2020-09-23 | End: 2020-09-24

## 2020-09-23 RX ORDER — SELENIUM SULFIDE 2.5 MG/100ML
LOTION TOPICAL
Status: DISCONTINUED | OUTPATIENT
Start: 2020-09-23 | End: 2020-09-24

## 2020-09-23 RX ORDER — HYDROCODONE BITARTRATE AND ACETAMINOPHEN 5; 325 MG/1; MG/1
1 TABLET ORAL EVERY 4 HOURS PRN
Status: DISCONTINUED | OUTPATIENT
Start: 2020-09-23 | End: 2020-09-24

## 2020-09-23 RX ORDER — MEPERIDINE HYDROCHLORIDE 25 MG/ML
12.5 INJECTION INTRAMUSCULAR; INTRAVENOUS; SUBCUTANEOUS AS NEEDED
Status: DISCONTINUED | OUTPATIENT
Start: 2020-09-23 | End: 2020-09-23 | Stop reason: HOSPADM

## 2020-09-23 RX ADMIN — SODIUM CHLORIDE, SODIUM LACTATE, POTASSIUM CHLORIDE, CALCIUM CHLORIDE: 600; 310; 30; 20 INJECTION, SOLUTION INTRAVENOUS at 12:40:00

## 2020-09-23 RX ADMIN — EPHEDRINE SULFATE 10 MG: 50 INJECTION, SOLUTION INTRAVENOUS at 13:10:00

## 2020-09-23 RX ADMIN — LIDOCAINE HYDROCHLORIDE 50 MG: 10 INJECTION, SOLUTION EPIDURAL; INFILTRATION; INTRACAUDAL; PERINEURAL at 12:42:00

## 2020-09-23 RX ADMIN — GLYCOPYRROLATE 0.4 MG: 0.2 INJECTION, SOLUTION INTRAMUSCULAR; INTRAVENOUS at 13:25:00

## 2020-09-23 RX ADMIN — NEOSTIGMINE METHYLSULFATE 4 MG: 1 INJECTION INTRAVENOUS at 13:25:00

## 2020-09-23 RX ADMIN — ONDANSETRON 4 MG: 2 INJECTION INTRAMUSCULAR; INTRAVENOUS at 12:50:00

## 2020-09-23 RX ADMIN — ROCURONIUM BROMIDE 50 MG: 10 INJECTION, SOLUTION INTRAVENOUS at 12:42:00

## 2020-09-23 RX ADMIN — PHENYLEPHRINE HCL 200 MCG: 10 MG/ML VIAL (ML) INJECTION at 13:12:00

## 2020-09-23 RX ADMIN — PHENYLEPHRINE HCL 100 MCG: 10 MG/ML VIAL (ML) INJECTION at 13:09:00

## 2020-09-23 RX ADMIN — DEXAMETHASONE SODIUM PHOSPHATE 8 MG: 4 MG/ML VIAL (ML) INJECTION at 12:50:00

## 2020-09-23 NOTE — INTERVAL H&P NOTE
Pre-op Diagnosis: Presence of intrathecal pump [Z97.8]    The above referenced H&P was reviewed by Hamilton Sibley PA-C on 9/23/2020, the patient was examined and no significant changes have occurred in the patient's condition since the H&P was performed.

## 2020-09-23 NOTE — ANESTHESIA PROCEDURE NOTES
Airway  Urgency: elective      General Information and Staff    Patient location during procedure: OR  Anesthesiologist: Javad Shafer MD  Resident/CRNA: Raad Melchor CRNA  Performed: CRNA     Indications and Patient Condition  Indications for airway man

## 2020-09-23 NOTE — CONSULTS
[facility]  Report of Consultation    Darrelyn Epley Patient Status:  Inpatient    3/23/1970 MRN [id number]   [facility] 7NE-A Attending Tony Dsouza MD   Hosp Day # 0 PCP Jens Arciniega MD     Reason for Consultation:  Medic lumbar baclofen pump(komal/geovanny)   • OTHER SURGICAL HISTORY  2008    G tube removed  (Terrie)   • OTHER SURGICAL HISTORY  2014    baclofen pump replacement   (sani)     History reviewed. No pertinent family history.   Social History:   reports that he (LUMINAL) 64.8 MG tab 64.8 mg, 64.8 mg, Oral, BID  •  Sulfacetamide Sodium (Acne) 10 % LOTN, , Topical, PRN  •  Sulfamethoxazole-TMP DS (BACTRIM DS) 800-160 MG per tab 1 tablet, 1 tablet, Oral, BID  •  sodium chloride 0.9% IV bolus 500 mL, 500 mL, Intraven 97.9 °F (36.6 °C), temperature source Oral, resp. rate 16, height 6' 1\" (1.854 m), weight 190 lb (86.2 kg), SpO2 95 %. General: No acute distress. Alert and oriented x 3. HEENT: Moist mucous membranes. EOM-I. PERRL  Neck: No lymphadenopathy. No JVD.  No

## 2020-09-23 NOTE — BRIEF OP NOTE
Pre-Operative Diagnosis: Presence of intrathecal pump [Z97.8]     Post-Operative Diagnosis: Presence of intrathecal pump [Z97.8]      Procedure Performed:   Procedure(s):  REPLACEMENT OF INTRATHECAL PUMP     Surgeon(s) and Role:     * Hilario Acosta,

## 2020-09-23 NOTE — ANESTHESIA POSTPROCEDURE EVALUATION
[address] [place] [address] Patient Status:  Inpatient   Age/Gender 52year old male MRN [id number]   [facility] SURGERY Attending Srini Mcgraw MD   Hosp Day # 0 PCP Cabrera Corbin MD       Anesthesia Post-op Note    Procedure(s):

## 2020-09-23 NOTE — PLAN OF CARE
Patient admitted from PACU post procedure  Awake, alert and oriented x4; soft spoken  Denies pain/discomfort  No acute neurological deficits   Left pupil non-reactive   R facial droop   Right sided weakness   BLE weakness   Spouse at bedside   Oriented to

## 2020-09-23 NOTE — OPERATIVE REPORT
Operative Note    Patient Name: Rd Chenumbronnie    Preoperative Diagnosis: Presence of intrathecal pump [Z97.8]    Postoperative Diagnosis: same    Primary Surgeon: Sophie Hooper    Assistant: Jeri Gardner pa-c, who was essential to the case includi

## 2020-09-23 NOTE — ANESTHESIA PREPROCEDURE EVALUATION
PRE-OP EVALUATION    Patient Name: Enrique Callejas    Pre-op Diagnosis: Presence of intrathecal pump [Z97.8]    Procedure(s):  REPLACEMENT OF INTRATHECAL PUMP AND ALL INDICATED PROCEDURES.     Surgeon(s) and Role:     * Vinh Tyler MD - Primary 6    •  Cholecalciferol (VITAMIN D) 1000 units Oral Tab, Take 1 tablet by mouth daily. , Disp: , Rfl:     •  Multiple Vitamins-Minerals (MULTIVITAMIN ADULT OR), Take 1 tablet by mouth daily. , Disp: , Rfl:     •  Sulfacetamide Sodium, Acne, 10 % External Lot (niharika)     Social History    Tobacco Use      Smoking status: Never Smoker      Smokeless tobacco: Never Used    Alcohol use: No      Drug use: No     Available pre-op labs reviewed.   Lab Results   Component Value Date    WBC 4.09 09/15/2020    RBC 5.13 09

## 2020-09-24 VITALS
WEIGHT: 190 LBS | HEART RATE: 91 BPM | TEMPERATURE: 98 F | SYSTOLIC BLOOD PRESSURE: 116 MMHG | RESPIRATION RATE: 14 BRPM | DIASTOLIC BLOOD PRESSURE: 78 MMHG | OXYGEN SATURATION: 92 % | BODY MASS INDEX: 25.18 KG/M2 | HEIGHT: 73 IN

## 2020-09-24 PROBLEM — Z97.8 STATUS POST INSERTION OF INTRATHECAL BACLOFEN PUMP: Status: ACTIVE | Noted: 2020-09-24

## 2020-09-24 LAB
ANION GAP SERPL CALC-SCNC: 4 MMOL/L (ref 0–18)
BASOPHILS # BLD AUTO: 0.03 X10(3) UL (ref 0–0.2)
BASOPHILS NFR BLD AUTO: 0.4 %
BUN BLD-MCNC: 13 MG/DL (ref 7–18)
BUN/CREAT SERPL: 18.6 (ref 10–20)
CALCIUM BLD-MCNC: 8.8 MG/DL (ref 8.5–10.1)
CHLORIDE SERPL-SCNC: 108 MMOL/L (ref 98–112)
CO2 SERPL-SCNC: 29 MMOL/L (ref 21–32)
CREAT BLD-MCNC: 0.7 MG/DL
DEPRECATED RDW RBC AUTO: 42.8 FL (ref 35.1–46.3)
EOSINOPHIL # BLD AUTO: 0.13 X10(3) UL (ref 0–0.7)
EOSINOPHIL NFR BLD AUTO: 1.7 %
ERYTHROCYTE [DISTWIDTH] IN BLOOD BY AUTOMATED COUNT: 13.1 % (ref 11–15)
GLUCOSE BLD-MCNC: 89 MG/DL (ref 70–99)
HCT VFR BLD AUTO: 39 %
HGB BLD-MCNC: 13.1 G/DL
IMM GRANULOCYTES # BLD AUTO: 0.02 X10(3) UL (ref 0–1)
IMM GRANULOCYTES NFR BLD: 0.3 %
LYMPHOCYTES # BLD AUTO: 1.68 X10(3) UL (ref 1–4)
LYMPHOCYTES NFR BLD AUTO: 21.9 %
MCH RBC QN AUTO: 30.1 PG (ref 26–34)
MCHC RBC AUTO-ENTMCNC: 33.6 G/DL (ref 31–37)
MCV RBC AUTO: 89.7 FL
MONOCYTES # BLD AUTO: 0.65 X10(3) UL (ref 0.1–1)
MONOCYTES NFR BLD AUTO: 8.5 %
NEUTROPHILS # BLD AUTO: 5.15 X10 (3) UL (ref 1.5–7.7)
NEUTROPHILS # BLD AUTO: 5.15 X10(3) UL (ref 1.5–7.7)
NEUTROPHILS NFR BLD AUTO: 67.2 %
OSMOLALITY SERPL CALC.SUM OF ELEC: 292 MOSM/KG (ref 275–295)
PLATELET # BLD AUTO: 168 10(3)UL (ref 150–450)
POTASSIUM SERPL-SCNC: 3.8 MMOL/L (ref 3.5–5.1)
RBC # BLD AUTO: 4.35 X10(6)UL
SODIUM SERPL-SCNC: 141 MMOL/L (ref 136–145)
WBC # BLD AUTO: 7.7 X10(3) UL (ref 4–11)

## 2020-09-24 PROCEDURE — 99213 OFFICE O/P EST LOW 20 MIN: CPT | Performed by: INTERNAL MEDICINE

## 2020-09-24 RX ORDER — HYDROCODONE BITARTRATE AND ACETAMINOPHEN 5; 325 MG/1; MG/1
1-2 TABLET ORAL EVERY 4 HOURS PRN
Qty: 30 TABLET | Refills: 0 | Status: SHIPPED | OUTPATIENT
Start: 2020-09-24 | End: 2021-03-02

## 2020-09-24 NOTE — PHYSICAL THERAPY NOTE
PHYSICAL THERAPY QUICK EVALUATION - INPATIENT    Room Number: 3232/7204-S  Evaluation Date: 9/24/2020  Presenting Problem: s/p baclofen pump exchange  Physician Order: PT Eval and Treat    Problem List  Principal Problem:    Status post insertion of intr generally all joints. NEUROLOGICAL FINDINGS                      ACTIVITY TOLERANCE                         O2 WALK                  AM-PAC '6-Clicks' INPATIENT SHORT FORM - BASIC MOBILITY  How much difficulty does the patient currently have. ..  -   T completed include [facility]. Based on this evaluation, patient's clinical presentation is stable and overall evaluation complexity is considered low. Pt likely presents near baseline level of function at this time.     PT Discharge Recommendations: Home    PL

## 2020-09-24 NOTE — PLAN OF CARE
Alert & oriented x4. Tele, NSR  RA  Patient denies pain. No acute neurological changes  Soft spoken, slurred speech, Right facial droop  Right sided weakness, BLE weakness  L abd incision C/D/I  BLE non-pitting edema.   Pedal pulses palpable, pt denies n

## 2020-09-24 NOTE — RESPIRATORY THERAPY NOTE
COREY - Equipment Use Daily Summary:  · Set Mode   · Usage in hours:   · 90% Pressure (EPAP) level:   · 90% Insp Pressure (IPAP):   · AHI:   · Supplemental Oxygen:  · Comments: DID NOT USE

## 2020-09-24 NOTE — PROGRESS NOTES
Neurosurgery  2020    Aaron Jaypetar PCP:  Israel Mclean MD    3/23/1970 MRN [id number]     HISTORY OF PRESENT ILLNESS:  Giovanni Duverney is a(n) 52year old male status post baclofen pump exchange  He is doing well  No complaints      PHYSICAL EXAM

## 2020-09-24 NOTE — PLAN OF CARE
Assumed care of pt at 1900. Pt AOx4. Soft spoken. Pt no c/o pain. Left abdominal incision site with dressing DI. Left pupil non reactive. R facial droop. Righ sided weakness. BLE weakness. Briefed for incontinence. Will continue to monitor.

## 2020-09-24 NOTE — OCCUPATIONAL THERAPY NOTE
OCCUPATIONAL THERAPY                        OT order received, chart reviewed. Patient receives assistance for all self-care from caregiver and spouse at baseline. No skilled OT needs are identified at this time. Will DC orders.

## 2020-09-24 NOTE — PROGRESS NOTES
[facility]  Progress Note     Ginny Cantu Patient Status:  Outpatient in a Bed    3/23/1970 MRN [id number]   [facility] 7NE-A Attending Sam Moore MD   Hosp Day # 1 PCP Soila Fernandes MD     Chief Complaint: S/p replacem Oral Daily   • cholecalciferol  1,000 Units Oral Daily   • finasteride  1 mg Oral Daily   • Fluocinolone Acetonide   Topical Daily   • folic acid  1 mg Oral Daily   • gabapentin  600 mg Oral BID   • gabapentin  300 mg Oral Q24H   • lamoTRIgine  150 mg Oral

## 2020-09-24 NOTE — PLAN OF CARE
NURSING DISCHARGE NOTE    Discharged Home via Wheelchair. Accompanied by Family member  Belongings Taken by patient/family. AVS handout reviewed with patient and spouse at bedside. Incision instructions reviewed.   Educated on signs of infection an

## 2020-10-09 ENCOUNTER — OFFICE VISIT (OUTPATIENT)
Dept: SURGERY | Facility: CLINIC | Age: 50
End: 2020-10-09
Payer: MEDICARE

## 2020-10-09 VITALS — HEART RATE: 74 BPM | DIASTOLIC BLOOD PRESSURE: 80 MMHG | SYSTOLIC BLOOD PRESSURE: 120 MMHG

## 2020-10-09 DIAGNOSIS — Z97.8 PRESENCE OF INTRATHECAL PUMP: Primary | ICD-10-CM

## 2020-10-09 PROCEDURE — 99024 POSTOP FOLLOW-UP VISIT: CPT | Performed by: PHYSICIAN ASSISTANT

## 2020-10-09 NOTE — PROGRESS NOTES
Pt is here for post op     REPLACEMENT OF INTRATHECAL PUMP AND ALL INDICATED PROCEDURES. 9/23/2020       Pt states that he has been doing well.

## 2020-10-09 NOTE — PROGRESS NOTES
Neurosurgery Clinic Visit  10/9/2020    Whitney Jacobo PCP:  Leila Pritchett MD    3/23/1970 MRN [id number]     HISTORY OF PRESENT ILLNESS:  Whitney Jacobo is a(n) 52year old male who is here 2 weeks s/p replacement of baclofen pump.   He is feeling wel

## 2020-11-04 ENCOUNTER — TELEPHONE (OUTPATIENT)
Dept: SURGERY | Facility: CLINIC | Age: 50
End: 2020-11-04

## 2020-11-04 NOTE — TELEPHONE ENCOUNTER
During last visit on 10/09/20, it was discussed that he could return to clinic as needed. Spoke to patients spouse and explained who stated appointment for tomorrow is in fact not needed, and, she will call in the future for any issues that may arise.   Mi

## 2020-11-04 NOTE — TELEPHONE ENCOUNTER
pt's wife told by patient that Dr Milton aDy does not need to see him since he was here for 2 week postop. Wife is worried a bit that  confused about this. Appt is scheduled for tomorrow 6 wk postop. Was patient told he could skip this?  Please call wi

## 2021-01-11 ENCOUNTER — TELEPHONE (OUTPATIENT)
Dept: SURGERY | Facility: CLINIC | Age: 51
End: 2021-01-11

## 2021-01-11 NOTE — TELEPHONE ENCOUNTER
Rebekah from [facility] where patient resides is requesting setting information from patient's pain pump replacement that Dr La Mak performed on 9-. Patient told her that his pump has been beeping for 5 days.   Per report the alarm date was 1-1-21 wi

## 2021-01-11 NOTE — TELEPHONE ENCOUNTER
RN calling from [facility] requesting fill date on pt's baclofen pump from when it was implanted by Dr Princess Sage

## 2021-08-24 ENCOUNTER — TELEPHONE (OUTPATIENT)
Dept: SURGERY | Facility: CLINIC | Age: 51
End: 2021-08-24

## 2021-08-24 NOTE — TELEPHONE ENCOUNTER
Pt states he sees outstanding xray orders dated 8.27.20 in [address] Can orders be removed from his chart?

## 2022-04-06 RX ORDER — BIOTIN 10 MG
TABLET ORAL DAILY
COMMUNITY

## 2022-04-06 RX ORDER — FOLIC ACID 1 MG/1
TABLET ORAL DAILY
COMMUNITY

## 2022-04-09 ENCOUNTER — LAB ENCOUNTER (OUTPATIENT)
Dept: LAB | Facility: HOSPITAL | Age: 52
End: 2022-04-09
Attending: SURGERY
Payer: MEDICARE

## 2022-04-09 DIAGNOSIS — Z01.812 ENCOUNTER FOR PREPROCEDURE SCREENING LABORATORY TESTING FOR COVID-19: ICD-10-CM

## 2022-04-09 DIAGNOSIS — Z20.822 ENCOUNTER FOR PREPROCEDURE SCREENING LABORATORY TESTING FOR COVID-19: ICD-10-CM

## 2022-04-10 ENCOUNTER — ANESTHESIA EVENT (OUTPATIENT)
Dept: SURGERY | Facility: HOSPITAL | Age: 52
End: 2022-04-10
Payer: MEDICARE

## 2022-04-10 LAB — SARS-COV-2 RNA RESP QL NAA+PROBE: NOT DETECTED

## 2022-04-11 ENCOUNTER — ANESTHESIA (OUTPATIENT)
Dept: SURGERY | Facility: HOSPITAL | Age: 52
End: 2022-04-11
Payer: MEDICARE

## 2022-04-11 ENCOUNTER — HOSPITAL ENCOUNTER (OUTPATIENT)
Facility: HOSPITAL | Age: 52
Setting detail: HOSPITAL OUTPATIENT SURGERY
Discharge: HOME | End: 2022-04-11
Attending: SURGERY | Admitting: SURGERY
Payer: MEDICARE

## 2022-04-11 VITALS
DIASTOLIC BLOOD PRESSURE: 75 MMHG | WEIGHT: 169.75 LBS | OXYGEN SATURATION: 99 % | BODY MASS INDEX: 21.79 KG/M2 | HEART RATE: 65 BPM | TEMPERATURE: 98 F | SYSTOLIC BLOOD PRESSURE: 103 MMHG | HEIGHT: 74 IN | RESPIRATION RATE: 20 BRPM

## 2022-04-11 DIAGNOSIS — D17.1 LIPOMA OF TORSO: ICD-10-CM

## 2022-04-11 DIAGNOSIS — Z20.822 ENCOUNTER FOR PREPROCEDURE SCREENING LABORATORY TESTING FOR COVID-19: Primary | ICD-10-CM

## 2022-04-11 DIAGNOSIS — Z01.812 ENCOUNTER FOR PREPROCEDURE SCREENING LABORATORY TESTING FOR COVID-19: Primary | ICD-10-CM

## 2022-04-11 PROCEDURE — 0KB50ZZ EXCISION OF RIGHT SHOULDER MUSCLE, OPEN APPROACH: ICD-10-PCS | Performed by: SURGERY

## 2022-04-11 PROCEDURE — 88304 TISSUE EXAM BY PATHOLOGIST: CPT | Performed by: SURGERY

## 2022-04-11 RX ORDER — BUPIVACAINE HYDROCHLORIDE AND EPINEPHRINE 5; 5 MG/ML; UG/ML
INJECTION, SOLUTION EPIDURAL; INTRACAUDAL; PERINEURAL AS NEEDED
Status: DISCONTINUED | OUTPATIENT
Start: 2022-04-11 | End: 2022-04-11 | Stop reason: HOSPADM

## 2022-04-11 RX ORDER — HYDROMORPHONE HYDROCHLORIDE 1 MG/ML
0.4 INJECTION, SOLUTION INTRAMUSCULAR; INTRAVENOUS; SUBCUTANEOUS EVERY 5 MIN PRN
Status: DISCONTINUED | OUTPATIENT
Start: 2022-04-11 | End: 2022-04-11

## 2022-04-11 RX ORDER — HYDROCODONE BITARTRATE AND ACETAMINOPHEN 5; 325 MG/1; MG/1
1 TABLET ORAL AS NEEDED
Status: COMPLETED | OUTPATIENT
Start: 2022-04-11 | End: 2022-04-11

## 2022-04-11 RX ORDER — LIDOCAINE HYDROCHLORIDE 10 MG/ML
INJECTION, SOLUTION INFILTRATION; PERINEURAL AS NEEDED
Status: DISCONTINUED | OUTPATIENT
Start: 2022-04-11 | End: 2022-04-11 | Stop reason: HOSPADM

## 2022-04-11 RX ORDER — HYDROCODONE BITARTRATE AND ACETAMINOPHEN 5; 325 MG/1; MG/1
2 TABLET ORAL AS NEEDED
Status: COMPLETED | OUTPATIENT
Start: 2022-04-11 | End: 2022-04-11

## 2022-04-11 RX ORDER — SODIUM CHLORIDE, SODIUM LACTATE, POTASSIUM CHLORIDE, CALCIUM CHLORIDE 600; 310; 30; 20 MG/100ML; MG/100ML; MG/100ML; MG/100ML
INJECTION, SOLUTION INTRAVENOUS CONTINUOUS
Status: DISCONTINUED | OUTPATIENT
Start: 2022-04-11 | End: 2022-04-11

## 2022-04-11 RX ORDER — METOCLOPRAMIDE HYDROCHLORIDE 5 MG/ML
10 INJECTION INTRAMUSCULAR; INTRAVENOUS AS NEEDED
Status: DISCONTINUED | OUTPATIENT
Start: 2022-04-11 | End: 2022-04-11

## 2022-04-11 RX ORDER — ONDANSETRON 2 MG/ML
4 INJECTION INTRAMUSCULAR; INTRAVENOUS AS NEEDED
Status: DISCONTINUED | OUTPATIENT
Start: 2022-04-11 | End: 2022-04-11

## 2022-04-11 RX ORDER — MIDAZOLAM HYDROCHLORIDE 1 MG/ML
INJECTION INTRAMUSCULAR; INTRAVENOUS AS NEEDED
Status: DISCONTINUED | OUTPATIENT
Start: 2022-04-11 | End: 2022-04-11 | Stop reason: SURG

## 2022-04-11 RX ORDER — ACETAMINOPHEN 500 MG
1000 TABLET ORAL ONCE
Status: DISCONTINUED | OUTPATIENT
Start: 2022-04-11 | End: 2022-04-11 | Stop reason: HOSPADM

## 2022-04-11 RX ORDER — NALOXONE HYDROCHLORIDE 0.4 MG/ML
80 INJECTION, SOLUTION INTRAMUSCULAR; INTRAVENOUS; SUBCUTANEOUS AS NEEDED
Status: DISCONTINUED | OUTPATIENT
Start: 2022-04-11 | End: 2022-04-11

## 2022-04-11 RX ORDER — LIDOCAINE HYDROCHLORIDE 10 MG/ML
INJECTION, SOLUTION EPIDURAL; INFILTRATION; INTRACAUDAL; PERINEURAL AS NEEDED
Status: DISCONTINUED | OUTPATIENT
Start: 2022-04-11 | End: 2022-04-11 | Stop reason: SURG

## 2022-04-11 RX ORDER — CEFAZOLIN SODIUM/WATER 2 G/20 ML
2 SYRINGE (ML) INTRAVENOUS ONCE
Status: COMPLETED | OUTPATIENT
Start: 2022-04-11 | End: 2022-04-11

## 2022-04-11 RX ORDER — HYDROCODONE BITARTRATE AND ACETAMINOPHEN 5; 325 MG/1; MG/1
1-2 TABLET ORAL EVERY 6 HOURS PRN
Qty: 30 TABLET | Refills: 0 | Status: SHIPPED | OUTPATIENT
Start: 2022-04-11

## 2022-04-11 RX ORDER — ONDANSETRON 2 MG/ML
INJECTION INTRAMUSCULAR; INTRAVENOUS AS NEEDED
Status: DISCONTINUED | OUTPATIENT
Start: 2022-04-11 | End: 2022-04-11 | Stop reason: SURG

## 2022-04-11 RX ADMIN — LIDOCAINE HYDROCHLORIDE 50 MG: 10 INJECTION, SOLUTION EPIDURAL; INFILTRATION; INTRACAUDAL; PERINEURAL at 11:36:00

## 2022-04-11 RX ADMIN — ONDANSETRON 4 MG: 2 INJECTION INTRAMUSCULAR; INTRAVENOUS at 11:49:00

## 2022-04-11 RX ADMIN — SODIUM CHLORIDE, SODIUM LACTATE, POTASSIUM CHLORIDE, CALCIUM CHLORIDE: 600; 310; 30; 20 INJECTION, SOLUTION INTRAVENOUS at 12:03:00

## 2022-04-11 RX ADMIN — CEFAZOLIN SODIUM/WATER 2 G: 2 G/20 ML SYRINGE (ML) INTRAVENOUS at 11:38:00

## 2022-04-11 RX ADMIN — MIDAZOLAM HYDROCHLORIDE 1 MG: 1 INJECTION INTRAMUSCULAR; INTRAVENOUS at 11:36:00

## 2022-04-11 NOTE — BRIEF OP NOTE
Pre-Operative Diagnosis: Lipoma of torso [D17.1]     Post-Operative Diagnosis: Lipoma of torso [D17.1]      Procedure Performed:   EXCISION OF LIPOMA ON RIGHT UPPER BACK    Surgeon(s) and Role:     Shelly Perera MD - Primary    Assistant(s):  Surgical Assistant.: KORI Victor     Surgical Findings: see doctation     Specimen: soft tissue mass     Estimated Blood Loss: Blood Output: 5 mL (4/11/2022 11:54 AM)          Lizbet Fishman MD  4/11/2022  11:56 AM

## 2022-04-11 NOTE — ANESTHESIA POSTPROCEDURE EVALUATION
[address] [place] [address] Patient Status:  Hospital Outpatient Surgery   Age/Gender 50year old male MRN [id number]   Location [address] [place] [address] Attending Desean Jason MD   Hosp Day # 0 PCP Deneen Hawthorne MD       Anesthesia Post-op Note    EXCISION OF LIPOMA ON RIGHT UPPER BACK    Procedure Summary     Date: 04/11/22 Room / Location: [facility] MAIN OR 08 / [facility] MAIN OR    Anesthesia Start: 4290 Anesthesia Stop: 3255    Procedure: EXCISION OF LIPOMA ON RIGHT UPPER BACK (Right Back) Diagnosis:       Lipoma of torso      (Lipoma of torso [D17.1])    Surgeons: Desean Jason MD Anesthesiologist: Jacqueline De La Rosa MD    Anesthesia Type: MAC ASA Status: 3          Anesthesia Type: MAC    Vitals Value Taken Time   /81 04/11/22 1208   Temp 97.6 04/11/22 1209   Pulse 75 04/11/22 1208   Resp 18 04/11/22 1209   SpO2 100 % 04/11/22 1208   Vitals shown include unvalidated device data. Patient Location: Same Day Surgery    Anesthesia Type: MAC    Airway Patency: patent    Postop Pain Control: adequate    Mental Status: preanesthetic baseline    Nausea/Vomiting: none    Cardiopulmonary/Hydration status: stable euvolemic    Complications: no apparent anesthesia related complications    Postop vital signs: stable    Dental Exam: Unchanged from Preop    Patient to be discharged from PACU when criteria met.

## 2022-04-12 NOTE — OPERATIVE REPORT
[facility]    PATIENT'S NAME: Torrey Hwang   ATTENDING PHYSICIAN: Mirian Bentley M.D. OPERATING PHYSICIAN: Mirian Bentley M.D. PATIENT ACCOUNT#:   [de-identified]    LOCATION:  [facility] 5 EDWP 10  MEDICAL RECORD #:   [id number]       YOB: 1970  ADMISSION DATE:       04/11/2022      OPERATION DATE:  04/11/2022    OPERATIVE REPORT      PREOPERATIVE DIAGNOSIS:  Lipoma, right posterior shoulder. POSTOPERATIVE DIAGNOSIS:  Soft tissue mass, right posterior shoulder. PROCEDURE:  Excision soft tissue mass, right posterior shoulder, size diameter 3 x 4 cm. ASSISTANT:  KORI Shaffer. ANESTHESIA:  MAC.    ESTIMATED BLOOD LOSS:  5 mL. OPERATIVE TECHNIQUE:  The patient was taken to the operating suite. After informed consent and proper identification, the patient's soft tissue mass on the right posterior shoulder was marked in the preop holding area. The patient was placed in left lateral position, administered IV sedation. The patient's right posterior shoulder and back was prepped and draped in the usual sterile fashion. A marking pen was utilized to plan a line of incision. The area was then infiltrated with local anesthesia. Skin incision was performed. Dissection proceeded through the subcutaneous tissues. There was noted to be a soft tissue mass involving the fascia and muscle overlying the right shoulder blade. This was excised using electrocautery and sent for pathologic evaluation. The muscle fascia and muscle was approximated with 3-0 Vicryl. The subcutaneous tissues were approximated with 3-0 Vicryl. The skin was then closed with 4-0 Vicryl in a subcuticular fashion. Dermabond was placed directly over the incision. The wound was then sterilely dressed. The patient tolerated the procedure well.     Dictated By Mirian Bentley M.D.  d: 04/11/2022 11:58:14  t: 04/11/2022 23:30:54  Job [id number]  /
Cardiac

## 2022-10-14 ENCOUNTER — TELEPHONE (OUTPATIENT)
Dept: PHYSICAL THERAPY | Facility: HOSPITAL | Age: 52
End: 2022-10-14

## 2022-10-23 ENCOUNTER — HOSPITAL ENCOUNTER (EMERGENCY)
Facility: HOSPITAL | Age: 52
Discharge: HOME | End: 2022-10-23
Attending: EMERGENCY MEDICINE
Payer: MEDICARE

## 2022-10-23 VITALS
TEMPERATURE: 97 F | HEART RATE: 85 BPM | OXYGEN SATURATION: 96 % | DIASTOLIC BLOOD PRESSURE: 90 MMHG | RESPIRATION RATE: 14 BRPM | BODY MASS INDEX: 20 KG/M2 | SYSTOLIC BLOOD PRESSURE: 131 MMHG | WEIGHT: 155 LBS

## 2022-10-23 DIAGNOSIS — K56.41 FECAL IMPACTION (HCC): Primary | ICD-10-CM

## 2022-10-23 PROCEDURE — 99283 EMERGENCY DEPT VISIT LOW MDM: CPT

## 2022-10-23 RX ORDER — MAG HYDROX/ALUMINUM HYD/SIMETH 400-400-40
1 SUSPENSION, ORAL (FINAL DOSE FORM) ORAL DAILY PRN
Qty: 25 SUPPOSITORY | Refills: 0 | Status: SHIPPED | OUTPATIENT
Start: 2022-10-23

## 2022-10-23 RX ORDER — BISACODYL 10 MG
10 SUPPOSITORY, RECTAL RECTAL ONCE
Status: COMPLETED | OUTPATIENT
Start: 2022-10-23 | End: 2022-10-23

## 2022-10-23 RX ORDER — BISACODYL 10 MG
10 SUPPOSITORY, RECTAL RECTAL DAILY PRN
Qty: 28 SUPPOSITORY | Refills: 0 | Status: SHIPPED | OUTPATIENT
Start: 2022-10-23 | End: 2022-11-22

## 2022-10-23 RX ORDER — DOCUSATE SODIUM 100 MG/1
100 CAPSULE, LIQUID FILLED ORAL 2 TIMES DAILY
Qty: 60 CAPSULE | Refills: 0 | Status: SHIPPED | OUTPATIENT
Start: 2022-10-23 | End: 2022-11-22

## 2023-06-27 ENCOUNTER — TELEPHONE (OUTPATIENT)
Dept: SPEECH THERAPY | Facility: HOSPITAL | Age: 53
End: 2023-06-27

## (undated) DEVICE — SCD SLEEVE KNEE HI BLEND

## (undated) DEVICE — STERILE POLYISOPRENE POWDER-FREE SURGICAL GLOVES: Brand: PROTEXIS

## (undated) DEVICE — Device: Brand: PLUMEPEN

## (undated) DEVICE — GAUZE SPONGES,12 PLY: Brand: CURITY

## (undated) DEVICE — STERILE SYNTHETIC POLYISOPRENE POWDER-FREE SURGICAL GLOVES WITH HYDROGEL COATING: Brand: PROTEXIS

## (undated) DEVICE — SUTURE VICRYL 3-0 RB-1

## (undated) DEVICE — MARKER SKIN PREP RESIST STRL

## (undated) DEVICE — LAMINECTOMY CDS: Brand: MEDLINE INDUSTRIES, INC.

## (undated) DEVICE — VIOLET BRAIDED (POLYGLACTIN 910), SYNTHETIC ABSORBABLE SUTURE: Brand: COATED VICRYL

## (undated) DEVICE — SUTURE SILK 2-0 SH

## (undated) DEVICE — SUTURE VICRYL 2-0

## (undated) DEVICE — C-ARM: Brand: UNBRANDED

## (undated) DEVICE — SOL  .9 1000ML BTL

## (undated) DEVICE — KENDALL SCD EXPRESS SLEEVES, KNEE LENGTH, MEDIUM: Brand: KENDALL SCD

## (undated) DEVICE — TRANSPOSAL ULTRAFLEX DUO/QUAD ULTRA CART MANIFOLD

## (undated) DEVICE — SUTURE VICRYL 2-0 CT-2

## (undated) DEVICE — UNDYED BRAIDED (POLYGLACTIN 910), SYNTHETIC ABSORBABLE SUTURE: Brand: COATED VICRYL

## (undated) DEVICE — DRESSING WOUND SILVERLON

## (undated) DEVICE — DRAPE,T,LAPARO,TRANS,STERILE: Brand: MEDLINE

## (undated) DEVICE — CHLORAPREP 26ML APPLICATOR

## (undated) DEVICE — CATH IV 14G X 5-1/4 ANGIO

## (undated) DEVICE — DERMABOND LIQUID ADHESIVE

## (undated) DEVICE — GOWN SURG AERO CHROME XXL

## (undated) DEVICE — MINI LAP PACK-LF: Brand: MEDLINE INDUSTRIES, INC.

## (undated) DEVICE — EXOFIN TISSUE ADHESIVE 1.0ML

## (undated) DEVICE — SUTURE VICRYL 3-0 SH

## (undated) DEVICE — SUTURE VICRYL 0 CT-2

## (undated) DEVICE — GLOVE BIOGEL ORTHO SZ 8

## (undated) NOTE — LETTER
20        RE: Magalie West     : 3/23/1970    Dear Dr. Sea Kuo,    This letter is to inform you that your patient is being scheduled for surgery with Dr. Yoni Sanchez on 20 at [facility]. We have asked the patient to contact your office t

## (undated) NOTE — ED AVS SNAPSHOT
Renetta Guzman   MRN: [id number]    Department:  [facility] Emergency Department   Date of Visit:  9/22/2019           Disclosure     Insurance plans vary and the physician(s) referred by the ER may not be covered by your plan.  Please contact tell this physician (or your personal doctor if your instructions are to return to your personal doctor) about any new or lasting problems. The primary care or specialist physician will see patients referred from the [facility] Emergency Department.  Claudine Leung